# Patient Record
Sex: MALE | Race: WHITE | Employment: FULL TIME | URBAN - METROPOLITAN AREA
[De-identification: names, ages, dates, MRNs, and addresses within clinical notes are randomized per-mention and may not be internally consistent; named-entity substitution may affect disease eponyms.]

---

## 2023-06-18 ENCOUNTER — HOSPITAL ENCOUNTER (INPATIENT)
Age: 63
LOS: 3 days | Discharge: HOME | DRG: 291 | End: 2023-06-21
Attending: EMERGENCY MEDICINE | Admitting: HOSPITALIST
Payer: COMMERCIAL

## 2023-06-18 ENCOUNTER — APPOINTMENT (OUTPATIENT)
Dept: CT IMAGING | Age: 63
DRG: 291 | End: 2023-06-18
Attending: EMERGENCY MEDICINE
Payer: COMMERCIAL

## 2023-06-18 ENCOUNTER — APPOINTMENT (OUTPATIENT)
Dept: GENERAL RADIOLOGY | Age: 63
DRG: 291 | End: 2023-06-18
Payer: COMMERCIAL

## 2023-06-18 DIAGNOSIS — I50.9 NEW ONSET OF CONGESTIVE HEART FAILURE (HCC): Primary | ICD-10-CM

## 2023-06-18 PROBLEM — R06.02 SOB (SHORTNESS OF BREATH): Status: ACTIVE | Noted: 2023-06-18

## 2023-06-18 LAB
ALBUMIN SERPL-MCNC: 3.7 G/DL (ref 3.4–5)
ALBUMIN/GLOB SERPL: 0.8 {RATIO} (ref 1.1–2.2)
ALP SERPL-CCNC: 64 U/L (ref 40–129)
ALT SERPL-CCNC: 13 U/L (ref 10–40)
ANION GAP SERPL CALCULATED.3IONS-SCNC: 8 MMOL/L (ref 3–16)
AST SERPL-CCNC: 36 U/L (ref 15–37)
BASE EXCESS BLDV CALC-SCNC: 7.8 MMOL/L
BASOPHILS # BLD: 0.2 K/UL (ref 0–0.2)
BASOPHILS NFR BLD: 2.6 %
BILIRUB SERPL-MCNC: 0.7 MG/DL (ref 0–1)
BUN SERPL-MCNC: 18 MG/DL (ref 7–20)
CALCIUM SERPL-MCNC: 9.3 MG/DL (ref 8.3–10.6)
CHLORIDE SERPL-SCNC: 100 MMOL/L (ref 99–110)
CO2 BLDV-SCNC: 39 MMOL/L
CO2 SERPL-SCNC: 31 MMOL/L (ref 21–32)
COHGB MFR BLDV: 2 %
CREAT SERPL-MCNC: 1 MG/DL (ref 0.8–1.3)
D DIMER: 1.21 UG/ML FEU (ref 0–0.6)
DEPRECATED RDW RBC AUTO: 14.4 % (ref 12.4–15.4)
EOSINOPHIL # BLD: 0.2 K/UL (ref 0–0.6)
EOSINOPHIL NFR BLD: 2.8 %
GFR SERPLBLD CREATININE-BSD FMLA CKD-EPI: >60 ML/MIN/{1.73_M2}
GLUCOSE BLD-MCNC: 104 MG/DL (ref 70–99)
GLUCOSE SERPL-MCNC: 98 MG/DL (ref 70–99)
HCO3 BLDV-SCNC: 36 MMOL/L (ref 23–29)
HCT VFR BLD AUTO: 36.3 % (ref 40.5–52.5)
HGB BLD-MCNC: 12 G/DL (ref 13.5–17.5)
INR PPP: 1.01 (ref 0.84–1.16)
IRON SATN MFR SERPL: 28 % (ref 20–50)
IRON SERPL-MCNC: 95 UG/DL (ref 59–158)
LACTATE BLDV-SCNC: 1.5 MMOL/L (ref 0.4–1.9)
LACTATE BLDV-SCNC: 1.5 MMOL/L (ref 0.4–1.9)
LYMPHOCYTES # BLD: 2.4 K/UL (ref 1–5.1)
LYMPHOCYTES NFR BLD: 34.8 %
MCH RBC QN AUTO: 34.1 PG (ref 26–34)
MCHC RBC AUTO-ENTMCNC: 33.2 G/DL (ref 31–36)
MCV RBC AUTO: 102.7 FL (ref 80–100)
METHGB MFR BLDV: 0.3 %
MONOCYTES # BLD: 0.6 K/UL (ref 0–1.3)
MONOCYTES NFR BLD: 8.9 %
NEUTROPHILS # BLD: 3.4 K/UL (ref 1.7–7.7)
NEUTROPHILS NFR BLD: 50.9 %
NT-PROBNP SERPL-MCNC: 1205 PG/ML (ref 0–124)
O2 THERAPY: ABNORMAL
PCO2 BLDV: 70.6 MMHG (ref 40–50)
PERFORMED ON: ABNORMAL
PH BLDV: 7.32 [PH] (ref 7.35–7.45)
PLATELET # BLD AUTO: 125 K/UL (ref 135–450)
PLATELET BLD QL SMEAR: ADEQUATE
PMV BLD AUTO: 10.9 FL (ref 5–10.5)
PO2 BLDV: <30 MMHG
POTASSIUM SERPL-SCNC: 4.9 MMOL/L (ref 3.5–5.1)
PROT SERPL-MCNC: 8.1 G/DL (ref 6.4–8.2)
PROTHROMBIN TIME: 13.3 SEC (ref 11.5–14.8)
RBC # BLD AUTO: 3.53 M/UL (ref 4.2–5.9)
REASON FOR REJECTION: NORMAL
REJECTED TEST: NORMAL
SAO2 % BLDV: 48 %
SARS-COV-2 RDRP RESP QL NAA+PROBE: NOT DETECTED
SLIDE REVIEW: ABNORMAL
SODIUM SERPL-SCNC: 139 MMOL/L (ref 136–145)
TIBC SERPL-MCNC: 344 UG/DL (ref 260–445)
TROPONIN, HIGH SENSITIVITY: 19 NG/L (ref 0–22)
TROPONIN, HIGH SENSITIVITY: 21 NG/L (ref 0–22)
TROPONIN, HIGH SENSITIVITY: 26 NG/L (ref 0–22)
TSH SERPL DL<=0.005 MIU/L-ACNC: 2.97 UIU/ML (ref 0.27–4.2)
WBC # BLD AUTO: 6.8 K/UL (ref 4–11)

## 2023-06-18 PROCEDURE — 83550 IRON BINDING TEST: CPT

## 2023-06-18 PROCEDURE — 93005 ELECTROCARDIOGRAM TRACING: CPT | Performed by: EMERGENCY MEDICINE

## 2023-06-18 PROCEDURE — 83880 ASSAY OF NATRIURETIC PEPTIDE: CPT

## 2023-06-18 PROCEDURE — 36415 COLL VENOUS BLD VENIPUNCTURE: CPT

## 2023-06-18 PROCEDURE — 85025 COMPLETE CBC W/AUTO DIFF WBC: CPT

## 2023-06-18 PROCEDURE — 71045 X-RAY EXAM CHEST 1 VIEW: CPT

## 2023-06-18 PROCEDURE — 85610 PROTHROMBIN TIME: CPT

## 2023-06-18 PROCEDURE — 1200000000 HC SEMI PRIVATE

## 2023-06-18 PROCEDURE — 99285 EMERGENCY DEPT VISIT HI MDM: CPT

## 2023-06-18 PROCEDURE — 83540 ASSAY OF IRON: CPT

## 2023-06-18 PROCEDURE — 84443 ASSAY THYROID STIM HORMONE: CPT

## 2023-06-18 PROCEDURE — 6370000000 HC RX 637 (ALT 250 FOR IP): Performed by: EMERGENCY MEDICINE

## 2023-06-18 PROCEDURE — 6360000002 HC RX W HCPCS: Performed by: HOSPITALIST

## 2023-06-18 PROCEDURE — 80053 COMPREHEN METABOLIC PANEL: CPT

## 2023-06-18 PROCEDURE — 6370000000 HC RX 637 (ALT 250 FOR IP): Performed by: HOSPITALIST

## 2023-06-18 PROCEDURE — 71260 CT THORAX DX C+: CPT

## 2023-06-18 PROCEDURE — 2700000000 HC OXYGEN THERAPY PER DAY

## 2023-06-18 PROCEDURE — 6360000004 HC RX CONTRAST MEDICATION: Performed by: EMERGENCY MEDICINE

## 2023-06-18 PROCEDURE — 6360000002 HC RX W HCPCS: Performed by: EMERGENCY MEDICINE

## 2023-06-18 PROCEDURE — 96374 THER/PROPH/DIAG INJ IV PUSH: CPT

## 2023-06-18 PROCEDURE — 84484 ASSAY OF TROPONIN QUANT: CPT

## 2023-06-18 PROCEDURE — 82803 BLOOD GASES ANY COMBINATION: CPT

## 2023-06-18 PROCEDURE — 94640 AIRWAY INHALATION TREATMENT: CPT

## 2023-06-18 PROCEDURE — 87635 SARS-COV-2 COVID-19 AMP PRB: CPT

## 2023-06-18 PROCEDURE — 94760 N-INVAS EAR/PLS OXIMETRY 1: CPT

## 2023-06-18 PROCEDURE — 83036 HEMOGLOBIN GLYCOSYLATED A1C: CPT

## 2023-06-18 PROCEDURE — 2580000003 HC RX 258: Performed by: HOSPITALIST

## 2023-06-18 PROCEDURE — 87040 BLOOD CULTURE FOR BACTERIA: CPT

## 2023-06-18 PROCEDURE — 83605 ASSAY OF LACTIC ACID: CPT

## 2023-06-18 PROCEDURE — 85379 FIBRIN DEGRADATION QUANT: CPT

## 2023-06-18 PROCEDURE — 2580000003 HC RX 258: Performed by: EMERGENCY MEDICINE

## 2023-06-18 RX ORDER — LISINOPRIL 40 MG/1
40 TABLET ORAL DAILY
Status: DISCONTINUED | OUTPATIENT
Start: 2023-06-19 | End: 2023-06-21 | Stop reason: HOSPADM

## 2023-06-18 RX ORDER — CETIRIZINE HYDROCHLORIDE 10 MG/1
10 TABLET ORAL DAILY
Status: DISCONTINUED | OUTPATIENT
Start: 2023-06-19 | End: 2023-06-21 | Stop reason: HOSPADM

## 2023-06-18 RX ORDER — ONDANSETRON 2 MG/ML
4 INJECTION INTRAMUSCULAR; INTRAVENOUS EVERY 6 HOURS PRN
Status: DISCONTINUED | OUTPATIENT
Start: 2023-06-18 | End: 2023-06-21 | Stop reason: HOSPADM

## 2023-06-18 RX ORDER — ACETAMINOPHEN 325 MG/1
650 TABLET ORAL EVERY 6 HOURS PRN
Status: DISCONTINUED | OUTPATIENT
Start: 2023-06-18 | End: 2023-06-21 | Stop reason: HOSPADM

## 2023-06-18 RX ORDER — LISINOPRIL 40 MG/1
40 TABLET ORAL DAILY
COMMUNITY

## 2023-06-18 RX ORDER — IPRATROPIUM BROMIDE AND ALBUTEROL SULFATE 2.5; .5 MG/3ML; MG/3ML
1 SOLUTION RESPIRATORY (INHALATION) ONCE
Status: COMPLETED | OUTPATIENT
Start: 2023-06-18 | End: 2023-06-18

## 2023-06-18 RX ORDER — ENOXAPARIN SODIUM 100 MG/ML
40 INJECTION SUBCUTANEOUS 2 TIMES DAILY
Status: DISCONTINUED | OUTPATIENT
Start: 2023-06-18 | End: 2023-06-21 | Stop reason: HOSPADM

## 2023-06-18 RX ORDER — METFORMIN HYDROCHLORIDE 500 MG/1
500 TABLET, EXTENDED RELEASE ORAL
COMMUNITY

## 2023-06-18 RX ORDER — SODIUM CHLORIDE 0.9 % (FLUSH) 0.9 %
5-40 SYRINGE (ML) INJECTION PRN
Status: DISCONTINUED | OUTPATIENT
Start: 2023-06-18 | End: 2023-06-21 | Stop reason: HOSPADM

## 2023-06-18 RX ORDER — POLYETHYLENE GLYCOL 3350 17 G/17G
17 POWDER, FOR SOLUTION ORAL DAILY PRN
Status: DISCONTINUED | OUTPATIENT
Start: 2023-06-18 | End: 2023-06-21 | Stop reason: HOSPADM

## 2023-06-18 RX ORDER — LOSARTAN POTASSIUM 25 MG/1
25 TABLET ORAL DAILY
Status: DISCONTINUED | OUTPATIENT
Start: 2023-06-18 | End: 2023-06-18

## 2023-06-18 RX ORDER — FUROSEMIDE 10 MG/ML
40 INJECTION INTRAMUSCULAR; INTRAVENOUS ONCE
Status: COMPLETED | OUTPATIENT
Start: 2023-06-18 | End: 2023-06-18

## 2023-06-18 RX ORDER — SODIUM CHLORIDE 0.9 % (FLUSH) 0.9 %
5-40 SYRINGE (ML) INJECTION EVERY 12 HOURS SCHEDULED
Status: DISCONTINUED | OUTPATIENT
Start: 2023-06-18 | End: 2023-06-21 | Stop reason: HOSPADM

## 2023-06-18 RX ORDER — INSULIN LISPRO 100 [IU]/ML
0-4 INJECTION, SOLUTION INTRAVENOUS; SUBCUTANEOUS NIGHTLY
Status: DISCONTINUED | OUTPATIENT
Start: 2023-06-18 | End: 2023-06-21 | Stop reason: HOSPADM

## 2023-06-18 RX ORDER — ASPIRIN 81 MG/1
81 TABLET ORAL DAILY
Status: DISCONTINUED | OUTPATIENT
Start: 2023-06-19 | End: 2023-06-21 | Stop reason: HOSPADM

## 2023-06-18 RX ORDER — LORATADINE 10 MG/1
5 TABLET ORAL DAILY
Status: DISCONTINUED | OUTPATIENT
Start: 2023-06-18 | End: 2023-06-18 | Stop reason: CLARIF

## 2023-06-18 RX ORDER — POTASSIUM CHLORIDE 20 MEQ/1
40 TABLET, EXTENDED RELEASE ORAL PRN
Status: DISCONTINUED | OUTPATIENT
Start: 2023-06-18 | End: 2023-06-21 | Stop reason: HOSPADM

## 2023-06-18 RX ORDER — INSULIN LISPRO 100 [IU]/ML
0-8 INJECTION, SOLUTION INTRAVENOUS; SUBCUTANEOUS
Status: DISCONTINUED | OUTPATIENT
Start: 2023-06-18 | End: 2023-06-21 | Stop reason: HOSPADM

## 2023-06-18 RX ORDER — MAGNESIUM SULFATE IN WATER 40 MG/ML
2000 INJECTION, SOLUTION INTRAVENOUS PRN
Status: DISCONTINUED | OUTPATIENT
Start: 2023-06-18 | End: 2023-06-21 | Stop reason: HOSPADM

## 2023-06-18 RX ORDER — IPRATROPIUM BROMIDE AND ALBUTEROL SULFATE 2.5; .5 MG/3ML; MG/3ML
1 SOLUTION RESPIRATORY (INHALATION)
Status: DISCONTINUED | OUTPATIENT
Start: 2023-06-19 | End: 2023-06-19

## 2023-06-18 RX ORDER — DEXTROSE MONOHYDRATE 100 MG/ML
INJECTION, SOLUTION INTRAVENOUS CONTINUOUS PRN
Status: DISCONTINUED | OUTPATIENT
Start: 2023-06-18 | End: 2023-06-21 | Stop reason: HOSPADM

## 2023-06-18 RX ORDER — FAMOTIDINE 20 MG/1
20 TABLET, FILM COATED ORAL 2 TIMES DAILY
Status: DISCONTINUED | OUTPATIENT
Start: 2023-06-18 | End: 2023-06-21 | Stop reason: HOSPADM

## 2023-06-18 RX ORDER — FUROSEMIDE 10 MG/ML
40 INJECTION INTRAMUSCULAR; INTRAVENOUS 2 TIMES DAILY
Status: DISCONTINUED | OUTPATIENT
Start: 2023-06-18 | End: 2023-06-20

## 2023-06-18 RX ORDER — LEVOFLOXACIN 500 MG/1
500 TABLET, FILM COATED ORAL DAILY
Status: DISCONTINUED | OUTPATIENT
Start: 2023-06-18 | End: 2023-06-20

## 2023-06-18 RX ORDER — SODIUM CHLORIDE 9 MG/ML
INJECTION, SOLUTION INTRAVENOUS PRN
Status: DISCONTINUED | OUTPATIENT
Start: 2023-06-18 | End: 2023-06-21 | Stop reason: HOSPADM

## 2023-06-18 RX ORDER — ASPIRIN 81 MG/1
81 TABLET ORAL DAILY
COMMUNITY

## 2023-06-18 RX ORDER — METOPROLOL SUCCINATE 25 MG/1
25 TABLET, EXTENDED RELEASE ORAL DAILY
Status: DISCONTINUED | OUTPATIENT
Start: 2023-06-18 | End: 2023-06-21 | Stop reason: HOSPADM

## 2023-06-18 RX ORDER — POTASSIUM CHLORIDE 7.45 MG/ML
10 INJECTION INTRAVENOUS PRN
Status: DISCONTINUED | OUTPATIENT
Start: 2023-06-18 | End: 2023-06-21 | Stop reason: HOSPADM

## 2023-06-18 RX ORDER — ACETAMINOPHEN 650 MG/1
650 SUPPOSITORY RECTAL EVERY 6 HOURS PRN
Status: DISCONTINUED | OUTPATIENT
Start: 2023-06-18 | End: 2023-06-21 | Stop reason: HOSPADM

## 2023-06-18 RX ORDER — ONDANSETRON 4 MG/1
4 TABLET, ORALLY DISINTEGRATING ORAL EVERY 8 HOURS PRN
Status: DISCONTINUED | OUTPATIENT
Start: 2023-06-18 | End: 2023-06-21 | Stop reason: HOSPADM

## 2023-06-18 RX ADMIN — CEFTRIAXONE 1000 MG: 1 INJECTION, POWDER, FOR SOLUTION INTRAMUSCULAR; INTRAVENOUS at 16:36

## 2023-06-18 RX ADMIN — Medication 10 ML: at 21:47

## 2023-06-18 RX ADMIN — LEVOFLOXACIN 500 MG: 500 TABLET, FILM COATED ORAL at 21:40

## 2023-06-18 RX ADMIN — IOPAMIDOL 75 ML: 755 INJECTION, SOLUTION INTRAVENOUS at 14:58

## 2023-06-18 RX ADMIN — FUROSEMIDE 40 MG: 10 INJECTION, SOLUTION INTRAMUSCULAR; INTRAVENOUS at 14:40

## 2023-06-18 RX ADMIN — FAMOTIDINE 20 MG: 20 TABLET ORAL at 21:40

## 2023-06-18 RX ADMIN — FUROSEMIDE 40 MG: 10 INJECTION, SOLUTION INTRAMUSCULAR; INTRAVENOUS at 21:39

## 2023-06-18 RX ADMIN — METOPROLOL SUCCINATE 25 MG: 25 TABLET, EXTENDED RELEASE ORAL at 21:40

## 2023-06-18 RX ADMIN — ENOXAPARIN SODIUM 40 MG: 100 INJECTION SUBCUTANEOUS at 21:50

## 2023-06-18 RX ADMIN — IPRATROPIUM BROMIDE AND ALBUTEROL SULFATE 1 DOSE: 2.5; .5 SOLUTION RESPIRATORY (INHALATION) at 13:20

## 2023-06-18 ASSESSMENT — LIFESTYLE VARIABLES: HOW OFTEN DO YOU HAVE A DRINK CONTAINING ALCOHOL: NEVER

## 2023-06-18 ASSESSMENT — PAIN - FUNCTIONAL ASSESSMENT: PAIN_FUNCTIONAL_ASSESSMENT: NONE - DENIES PAIN

## 2023-06-19 LAB
ANION GAP SERPL CALCULATED.3IONS-SCNC: 12 MMOL/L (ref 3–16)
BASOPHILS # BLD: 0.1 K/UL (ref 0–0.2)
BASOPHILS NFR BLD: 1.2 %
BUN SERPL-MCNC: 19 MG/DL (ref 7–20)
CALCIUM SERPL-MCNC: 9.5 MG/DL (ref 8.3–10.6)
CHLORIDE SERPL-SCNC: 95 MMOL/L (ref 99–110)
CHOLEST SERPL-MCNC: 130 MG/DL (ref 0–199)
CO2 SERPL-SCNC: 31 MMOL/L (ref 21–32)
CREAT SERPL-MCNC: 1.3 MG/DL (ref 0.8–1.3)
DEPRECATED RDW RBC AUTO: 14.5 % (ref 12.4–15.4)
EKG ATRIAL RATE: 64 BPM
EKG DIAGNOSIS: NORMAL
EKG P AXIS: 2 DEGREES
EKG P-R INTERVAL: 214 MS
EKG Q-T INTERVAL: 414 MS
EKG QRS DURATION: 92 MS
EKG QTC CALCULATION (BAZETT): 427 MS
EKG R AXIS: -53 DEGREES
EKG T AXIS: 47 DEGREES
EKG VENTRICULAR RATE: 64 BPM
EOSINOPHIL # BLD: 0.2 K/UL (ref 0–0.6)
EOSINOPHIL NFR BLD: 3.2 %
EST. AVERAGE GLUCOSE BLD GHB EST-MCNC: 137 MG/DL
EST. AVERAGE GLUCOSE BLD GHB EST-MCNC: 137 MG/DL
GFR SERPLBLD CREATININE-BSD FMLA CKD-EPI: >60 ML/MIN/{1.73_M2}
GLUCOSE BLD-MCNC: 107 MG/DL (ref 70–99)
GLUCOSE BLD-MCNC: 114 MG/DL (ref 70–99)
GLUCOSE BLD-MCNC: 121 MG/DL (ref 70–99)
GLUCOSE BLD-MCNC: 151 MG/DL (ref 70–99)
GLUCOSE SERPL-MCNC: 124 MG/DL (ref 70–99)
HBA1C MFR BLD: 6.4 %
HBA1C MFR BLD: 6.4 %
HCT VFR BLD AUTO: 38.5 % (ref 40.5–52.5)
HDLC SERPL-MCNC: 52 MG/DL (ref 40–60)
HGB BLD-MCNC: 12.8 G/DL (ref 13.5–17.5)
INR PPP: 1.15 (ref 0.84–1.16)
LDLC SERPL CALC-MCNC: 60 MG/DL
LV EF: 58 %
LVEF MODALITY: NORMAL
LYMPHOCYTES # BLD: 2.4 K/UL (ref 1–5.1)
LYMPHOCYTES NFR BLD: 35.5 %
MAGNESIUM SERPL-MCNC: 1.5 MG/DL (ref 1.8–2.4)
MCH RBC QN AUTO: 33.9 PG (ref 26–34)
MCHC RBC AUTO-ENTMCNC: 33.2 G/DL (ref 31–36)
MCV RBC AUTO: 102.3 FL (ref 80–100)
MONOCYTES # BLD: 0.8 K/UL (ref 0–1.3)
MONOCYTES NFR BLD: 11.7 %
NEUTROPHILS # BLD: 3.3 K/UL (ref 1.7–7.7)
NEUTROPHILS NFR BLD: 48.4 %
PERFORMED ON: ABNORMAL
PLATELET # BLD AUTO: 144 K/UL (ref 135–450)
PMV BLD AUTO: 10.9 FL (ref 5–10.5)
POTASSIUM SERPL-SCNC: 4 MMOL/L (ref 3.5–5.1)
PROCALCITONIN SERPL IA-MCNC: 0.17 NG/ML (ref 0–0.15)
PROTHROMBIN TIME: 14.7 SEC (ref 11.5–14.8)
RBC # BLD AUTO: 3.77 M/UL (ref 4.2–5.9)
SODIUM SERPL-SCNC: 138 MMOL/L (ref 136–145)
TRIGL SERPL-MCNC: 90 MG/DL (ref 0–150)
TROPONIN, HIGH SENSITIVITY: 22 NG/L (ref 0–22)
TROPONIN, HIGH SENSITIVITY: 23 NG/L (ref 0–22)
TROPONIN, HIGH SENSITIVITY: 24 NG/L (ref 0–22)
TROPONIN, HIGH SENSITIVITY: 25 NG/L (ref 0–22)
VLDLC SERPL CALC-MCNC: 18 MG/DL
WBC # BLD AUTO: 6.8 K/UL (ref 4–11)

## 2023-06-19 PROCEDURE — 6370000000 HC RX 637 (ALT 250 FOR IP): Performed by: HOSPITALIST

## 2023-06-19 PROCEDURE — 1200000000 HC SEMI PRIVATE

## 2023-06-19 PROCEDURE — 85610 PROTHROMBIN TIME: CPT

## 2023-06-19 PROCEDURE — 85025 COMPLETE CBC W/AUTO DIFF WBC: CPT

## 2023-06-19 PROCEDURE — 93010 ELECTROCARDIOGRAM REPORT: CPT | Performed by: INTERNAL MEDICINE

## 2023-06-19 PROCEDURE — 36415 COLL VENOUS BLD VENIPUNCTURE: CPT

## 2023-06-19 PROCEDURE — 83735 ASSAY OF MAGNESIUM: CPT

## 2023-06-19 PROCEDURE — 84484 ASSAY OF TROPONIN QUANT: CPT

## 2023-06-19 PROCEDURE — 84145 PROCALCITONIN (PCT): CPT

## 2023-06-19 PROCEDURE — 94640 AIRWAY INHALATION TREATMENT: CPT

## 2023-06-19 PROCEDURE — 94760 N-INVAS EAR/PLS OXIMETRY 1: CPT

## 2023-06-19 PROCEDURE — 2580000003 HC RX 258: Performed by: HOSPITALIST

## 2023-06-19 PROCEDURE — 80048 BASIC METABOLIC PNL TOTAL CA: CPT

## 2023-06-19 PROCEDURE — 6360000002 HC RX W HCPCS: Performed by: HOSPITALIST

## 2023-06-19 PROCEDURE — 93306 TTE W/DOPPLER COMPLETE: CPT

## 2023-06-19 PROCEDURE — 80061 LIPID PANEL: CPT

## 2023-06-19 RX ORDER — IPRATROPIUM BROMIDE AND ALBUTEROL SULFATE 2.5; .5 MG/3ML; MG/3ML
1 SOLUTION RESPIRATORY (INHALATION) EVERY 4 HOURS PRN
Status: DISCONTINUED | OUTPATIENT
Start: 2023-06-19 | End: 2023-06-21 | Stop reason: HOSPADM

## 2023-06-19 RX ADMIN — LISINOPRIL 40 MG: 40 TABLET ORAL at 09:11

## 2023-06-19 RX ADMIN — IPRATROPIUM BROMIDE AND ALBUTEROL SULFATE 1 DOSE: 2.5; .5 SOLUTION RESPIRATORY (INHALATION) at 15:33

## 2023-06-19 RX ADMIN — IPRATROPIUM BROMIDE AND ALBUTEROL SULFATE 1 DOSE: 2.5; .5 SOLUTION RESPIRATORY (INHALATION) at 11:23

## 2023-06-19 RX ADMIN — ASPIRIN 81 MG: 81 TABLET, COATED ORAL at 09:11

## 2023-06-19 RX ADMIN — CETIRIZINE HYDROCHLORIDE 10 MG: 10 TABLET, FILM COATED ORAL at 09:11

## 2023-06-19 RX ADMIN — FAMOTIDINE 20 MG: 20 TABLET ORAL at 09:11

## 2023-06-19 RX ADMIN — IPRATROPIUM BROMIDE AND ALBUTEROL SULFATE 1 DOSE: 2.5; .5 SOLUTION RESPIRATORY (INHALATION) at 19:52

## 2023-06-19 RX ADMIN — Medication 5000 UNITS: at 09:11

## 2023-06-19 RX ADMIN — MAGNESIUM SULFATE HEPTAHYDRATE 2000 MG: 40 INJECTION, SOLUTION INTRAVENOUS at 16:45

## 2023-06-19 RX ADMIN — FUROSEMIDE 40 MG: 10 INJECTION, SOLUTION INTRAMUSCULAR; INTRAVENOUS at 19:24

## 2023-06-19 RX ADMIN — METOPROLOL SUCCINATE 25 MG: 25 TABLET, EXTENDED RELEASE ORAL at 09:11

## 2023-06-19 RX ADMIN — IPRATROPIUM BROMIDE AND ALBUTEROL SULFATE 1 DOSE: 2.5; .5 SOLUTION RESPIRATORY (INHALATION) at 07:19

## 2023-06-19 RX ADMIN — Medication 10 ML: at 09:12

## 2023-06-19 RX ADMIN — ENOXAPARIN SODIUM 40 MG: 100 INJECTION SUBCUTANEOUS at 09:11

## 2023-06-19 RX ADMIN — ENOXAPARIN SODIUM 40 MG: 100 INJECTION SUBCUTANEOUS at 20:34

## 2023-06-19 RX ADMIN — FAMOTIDINE 20 MG: 20 TABLET ORAL at 20:34

## 2023-06-19 RX ADMIN — FUROSEMIDE 40 MG: 10 INJECTION, SOLUTION INTRAMUSCULAR; INTRAVENOUS at 09:12

## 2023-06-19 RX ADMIN — Medication 10 ML: at 20:36

## 2023-06-19 RX ADMIN — LEVOFLOXACIN 500 MG: 500 TABLET, FILM COATED ORAL at 09:11

## 2023-06-19 NOTE — DISCHARGE INSTRUCTIONS
Extra Heart Failure sites:     [email or web address]   --- this is American Heart Association interactive Healthier Living with Heart Failure guidebook. Please click hyperlink or copy / paste link into search bar. Use your mouse to scroll through the pages. Lots of information about weight monitoring, diet tips, activity, meds, etc    HF [place] luis f  -- this is a free smart phone luis f available for iPhone and Android download. Use your phone to track sodium / fluid intake, zone tool symptom tracking, weights, medications, etc. Click on this hyperlink  HF [place] Luis F   for QR code for easy download. DASH (Dietary Approach to Stop Hypertension) diet --  [email or web address].no -- this diet is a flexible eating plan that promotes heart healthy eating style. Click on hyperlink or copy / paste link into search bar. Lots of low sodium recipes and tips.     [email or web address]  -- more free recipes

## 2023-06-19 NOTE — H&P
Hospitalist  History and Physical    Patient:  Alise Mir  MRN: [id number]  PCP: José Manuel Camacho    CHIEF COMPLAINT: Shortness of breath      HISTORY OF PRESENT ILLNESS:   The patient Alise Mir is a 60 y.o.male   Patient presented to the emergency room with worsening shortness of breath. Patient's wife reported that patient had been sleeping in the chair more and more for the last several days. Patient reports dyspnea on exertion and orthopnea patient denies any chest pain. Patient denies weight gain in the last few weeks. Past Medical History:        Diagnosis Date    Arthritis     Asthma     COPD (chronic obstructive pulmonary disease) ([facility] [address]     Gout     High blood pressure        Past Surgical History:        Procedure Laterality Date    EYE SURGERY      KNEE ARTHROSCOPY Right 6/26/2014    medial meniscectomy & chondroplasty       Medications Prior to Admission:    Prior to Admission medications    Medication Sig Start Date End Date Taking? Authorizing Provider   aspirin 81 MG EC tablet Take 1 tablet by mouth daily   Yes Historical Provider, MD   lisinopril (PRINIVIL;ZESTRIL) 40 MG tablet Take 1 tablet by mouth daily   Yes Historical Provider, MD   metFORMIN (GLUCOPHAGE-XR) 500 MG extended release tablet Take 1 tablet by mouth daily (with breakfast)   Yes Historical Provider, MD   Loratadine (CLARITIN PO) Take  by mouth. Historical Provider, MD   Albuterol (PROVENTIL IN) Inhale  into the lungs. Historical Provider, MD   FIBER, GUAR GUM, PO Take  by mouth. Historical Provider, MD   vitamin D-3 (CHOLECALCIFEROL) 5000 UNITS TABS Take 1 tablet by mouth daily    Historical Provider, MD       Allergies:  Patient has no known allergies. Social History:   TOBACCO:   reports that he has never smoked. He has never used smokeless tobacco.  ETOH:   reports current alcohol use.         Family History:       Problem Relation Age of Onset    Anemia Sister     Other Father         gout           REVIEW OF

## 2023-06-20 LAB
ANION GAP SERPL CALCULATED.3IONS-SCNC: 8 MMOL/L (ref 3–16)
BUN SERPL-MCNC: 23 MG/DL (ref 7–20)
CALCIUM SERPL-MCNC: 9.8 MG/DL (ref 8.3–10.6)
CHLORIDE SERPL-SCNC: 92 MMOL/L (ref 99–110)
CO2 SERPL-SCNC: 35 MMOL/L (ref 21–32)
CREAT SERPL-MCNC: 1.4 MG/DL (ref 0.8–1.3)
GFR SERPLBLD CREATININE-BSD FMLA CKD-EPI: 56 ML/MIN/{1.73_M2}
GLUCOSE BLD-MCNC: 107 MG/DL (ref 70–99)
GLUCOSE BLD-MCNC: 108 MG/DL (ref 70–99)
GLUCOSE BLD-MCNC: 116 MG/DL (ref 70–99)
GLUCOSE BLD-MCNC: 119 MG/DL (ref 70–99)
GLUCOSE BLD-MCNC: 129 MG/DL (ref 70–99)
GLUCOSE SERPL-MCNC: 131 MG/DL (ref 70–99)
MAGNESIUM SERPL-MCNC: 1.8 MG/DL (ref 1.8–2.4)
PERFORMED ON: ABNORMAL
POTASSIUM SERPL-SCNC: 3.8 MMOL/L (ref 3.5–5.1)
SODIUM SERPL-SCNC: 135 MMOL/L (ref 136–145)
TROPONIN, HIGH SENSITIVITY: 22 NG/L (ref 0–22)
TROPONIN, HIGH SENSITIVITY: 23 NG/L (ref 0–22)
TROPONIN, HIGH SENSITIVITY: 24 NG/L (ref 0–22)

## 2023-06-20 PROCEDURE — 2580000003 HC RX 258: Performed by: HOSPITALIST

## 2023-06-20 PROCEDURE — 80048 BASIC METABOLIC PNL TOTAL CA: CPT

## 2023-06-20 PROCEDURE — 6370000000 HC RX 637 (ALT 250 FOR IP): Performed by: HOSPITALIST

## 2023-06-20 PROCEDURE — 36415 COLL VENOUS BLD VENIPUNCTURE: CPT

## 2023-06-20 PROCEDURE — 84484 ASSAY OF TROPONIN QUANT: CPT

## 2023-06-20 PROCEDURE — 83735 ASSAY OF MAGNESIUM: CPT

## 2023-06-20 PROCEDURE — 6360000002 HC RX W HCPCS: Performed by: HOSPITALIST

## 2023-06-20 PROCEDURE — 1200000000 HC SEMI PRIVATE

## 2023-06-20 PROCEDURE — 94760 N-INVAS EAR/PLS OXIMETRY 1: CPT

## 2023-06-20 RX ADMIN — LEVOFLOXACIN 500 MG: 500 TABLET, FILM COATED ORAL at 08:40

## 2023-06-20 RX ADMIN — LISINOPRIL 40 MG: 40 TABLET ORAL at 09:39

## 2023-06-20 RX ADMIN — Medication 5000 UNITS: at 08:40

## 2023-06-20 RX ADMIN — Medication 10 ML: at 08:43

## 2023-06-20 RX ADMIN — ENOXAPARIN SODIUM 40 MG: 100 INJECTION SUBCUTANEOUS at 08:39

## 2023-06-20 RX ADMIN — CETIRIZINE HYDROCHLORIDE 10 MG: 10 TABLET, FILM COATED ORAL at 08:40

## 2023-06-20 RX ADMIN — Medication 10 ML: at 20:33

## 2023-06-20 RX ADMIN — Medication 10 ML: at 08:48

## 2023-06-20 RX ADMIN — ASPIRIN 81 MG: 81 TABLET, COATED ORAL at 08:40

## 2023-06-20 RX ADMIN — FAMOTIDINE 20 MG: 20 TABLET ORAL at 20:32

## 2023-06-20 RX ADMIN — FUROSEMIDE 40 MG: 10 INJECTION, SOLUTION INTRAMUSCULAR; INTRAVENOUS at 08:40

## 2023-06-20 RX ADMIN — FAMOTIDINE 20 MG: 20 TABLET ORAL at 08:40

## 2023-06-20 RX ADMIN — ENOXAPARIN SODIUM 40 MG: 100 INJECTION SUBCUTANEOUS at 20:32

## 2023-06-20 RX ADMIN — METOPROLOL SUCCINATE 25 MG: 25 TABLET, EXTENDED RELEASE ORAL at 08:40

## 2023-06-20 NOTE — RT PROTOCOL NOTE
enter or revise RT Bronchodilator order(s) to two equivalent RT bronchodilator orders with one order with TID Frequency and one order with Frequency of every 4 hours PRN wheezing or increased work of breathing using Per Protocol order mode. 11-13 - enter or revise RT Bronchodilator order(s) to one equivalent RT bronchodilator order with QID Frequency and an Albuterol order with Frequency of every 4 hours PRN wheezing or increased work of breathing using Per Protocol order mode. Greater than 13 - enter or revise RT Bronchodilator order(s) to one equivalent RT bronchodilator order with every 4 hours Frequency and an Albuterol order with Frequency of every 2 hours PRN wheezing or increased work of breathing using Per Protocol order mode. RT to enter RT Home Evaluation for COPD & MDI Assessment order using Per Protocol order mode.     Electronically signed by Isma Hsu RCP on 6/19/2023 at 9:09 PM

## 2023-06-21 ENCOUNTER — APPOINTMENT (OUTPATIENT)
Dept: ULTRASOUND IMAGING | Age: 63
DRG: 291 | End: 2023-06-21
Payer: COMMERCIAL

## 2023-06-21 ENCOUNTER — APPOINTMENT (OUTPATIENT)
Dept: GENERAL RADIOLOGY | Age: 63
DRG: 291 | End: 2023-06-21
Payer: COMMERCIAL

## 2023-06-21 VITALS
SYSTOLIC BLOOD PRESSURE: 101 MMHG | RESPIRATION RATE: 14 BRPM | BODY MASS INDEX: 47.74 KG/M2 | WEIGHT: 315 LBS | HEIGHT: 68 IN | HEART RATE: 58 BPM | OXYGEN SATURATION: 96 % | TEMPERATURE: 98.1 F | DIASTOLIC BLOOD PRESSURE: 58 MMHG

## 2023-06-21 LAB
ALBUMIN SERPL-MCNC: 3.6 G/DL (ref 3.4–5)
ALBUMIN/GLOB SERPL: 0.8 {RATIO} (ref 1.1–2.2)
ALP SERPL-CCNC: 67 U/L (ref 40–129)
ALT SERPL-CCNC: 15 U/L (ref 10–40)
ANION GAP SERPL CALCULATED.3IONS-SCNC: 7 MMOL/L (ref 3–16)
AST SERPL-CCNC: 37 U/L (ref 15–37)
BILIRUB SERPL-MCNC: 1.1 MG/DL (ref 0–1)
BUN SERPL-MCNC: 28 MG/DL (ref 7–20)
CALCIUM SERPL-MCNC: 9.9 MG/DL (ref 8.3–10.6)
CHLORIDE SERPL-SCNC: 93 MMOL/L (ref 99–110)
CO2 SERPL-SCNC: 37 MMOL/L (ref 21–32)
CREAT SERPL-MCNC: 1.4 MG/DL (ref 0.8–1.3)
FERRITIN SERPL IA-MCNC: 109.3 NG/ML (ref 30–400)
GFR SERPLBLD CREATININE-BSD FMLA CKD-EPI: 56 ML/MIN/{1.73_M2}
GLUCOSE BLD-MCNC: 115 MG/DL (ref 70–99)
GLUCOSE BLD-MCNC: 116 MG/DL (ref 70–99)
GLUCOSE BLD-MCNC: 126 MG/DL (ref 70–99)
GLUCOSE SERPL-MCNC: 131 MG/DL (ref 70–99)
HAV IGM SERPL QL IA: NORMAL
HBV CORE IGM SERPL QL IA: NORMAL
HBV SURFACE AG SERPL QL IA: NORMAL
HCV AB SERPL QL IA: NORMAL
MAGNESIUM SERPL-MCNC: 1.9 MG/DL (ref 1.8–2.4)
NT-PROBNP SERPL-MCNC: 430 PG/ML (ref 0–124)
PERFORMED ON: ABNORMAL
POTASSIUM SERPL-SCNC: 4.2 MMOL/L (ref 3.5–5.1)
PROT SERPL-MCNC: 8.1 G/DL (ref 6.4–8.2)
SODIUM SERPL-SCNC: 137 MMOL/L (ref 136–145)

## 2023-06-21 PROCEDURE — 71045 X-RAY EXAM CHEST 1 VIEW: CPT

## 2023-06-21 PROCEDURE — 76705 ECHO EXAM OF ABDOMEN: CPT

## 2023-06-21 PROCEDURE — 80074 ACUTE HEPATITIS PANEL: CPT

## 2023-06-21 PROCEDURE — 82728 ASSAY OF FERRITIN: CPT

## 2023-06-21 PROCEDURE — 81256 HFE GENE: CPT

## 2023-06-21 PROCEDURE — 2700000000 HC OXYGEN THERAPY PER DAY

## 2023-06-21 PROCEDURE — 82104 ALPHA-1-ANTITRYPSIN PHENO: CPT

## 2023-06-21 PROCEDURE — 80053 COMPREHEN METABOLIC PANEL: CPT

## 2023-06-21 PROCEDURE — 6370000000 HC RX 637 (ALT 250 FOR IP): Performed by: HOSPITALIST

## 2023-06-21 PROCEDURE — 2580000003 HC RX 258: Performed by: HOSPITALIST

## 2023-06-21 PROCEDURE — 82103 ALPHA-1-ANTITRYPSIN TOTAL: CPT

## 2023-06-21 PROCEDURE — 94760 N-INVAS EAR/PLS OXIMETRY 1: CPT

## 2023-06-21 PROCEDURE — 36415 COLL VENOUS BLD VENIPUNCTURE: CPT

## 2023-06-21 PROCEDURE — 82105 ALPHA-FETOPROTEIN SERUM: CPT

## 2023-06-21 PROCEDURE — 6360000002 HC RX W HCPCS: Performed by: HOSPITALIST

## 2023-06-21 PROCEDURE — 83880 ASSAY OF NATRIURETIC PEPTIDE: CPT

## 2023-06-21 PROCEDURE — 83735 ASSAY OF MAGNESIUM: CPT

## 2023-06-21 RX ORDER — METOPROLOL SUCCINATE 25 MG/1
25 TABLET, EXTENDED RELEASE ORAL DAILY
Qty: 30 TABLET | Refills: 1 | Status: SHIPPED | OUTPATIENT
Start: 2023-06-22

## 2023-06-21 RX ORDER — FUROSEMIDE 40 MG/1
40 TABLET ORAL DAILY
Qty: 60 TABLET | Refills: 3 | Status: SHIPPED | OUTPATIENT
Start: 2023-06-22

## 2023-06-21 RX ADMIN — CETIRIZINE HYDROCHLORIDE 10 MG: 10 TABLET, FILM COATED ORAL at 08:46

## 2023-06-21 RX ADMIN — METOPROLOL SUCCINATE 25 MG: 25 TABLET, EXTENDED RELEASE ORAL at 08:46

## 2023-06-21 RX ADMIN — ASPIRIN 81 MG: 81 TABLET, COATED ORAL at 08:46

## 2023-06-21 RX ADMIN — ENOXAPARIN SODIUM 40 MG: 100 INJECTION SUBCUTANEOUS at 08:45

## 2023-06-21 RX ADMIN — Medication 10 ML: at 08:46

## 2023-06-21 RX ADMIN — FAMOTIDINE 20 MG: 20 TABLET ORAL at 08:46

## 2023-06-21 RX ADMIN — LISINOPRIL 40 MG: 40 TABLET ORAL at 08:46

## 2023-06-21 RX ADMIN — Medication 5000 UNITS: at 08:46

## 2023-06-21 NOTE — CONSULTS
Comprehensive Nutrition Assessment    Type and Reason for Visit:  Initial, Consult    Nutrition Recommendations/Plan:   Continue current diet. Diet education. Monitor intakes/labs/medications and reassess need for further nutrition therapy. Honor food/fluid preferences as possible. Malnutrition Assessment:  Malnutrition Status: At risk for malnutrition (Comment) (Cirrhosis.) (06/19/23 1003)    Context:  Acute Illness     Findings of the 6 clinical characteristics of malnutrition:  Energy Intake:  Mild decrease in energy intake (Comment)  Weight Loss:  No significant weight loss     Body Fat Loss:  No significant body fat loss     Muscle Mass Loss:  No significant muscle mass loss    Fluid Accumulation:  Moderate to Severe Extremities   Strength:  Not Performed    Nutrition Assessment:    Pt. reports experiencing SOB while at home with his wife starting several days ago, originally he throught it was d/t anxiety because his power was out. SOB worsening with increased activity. PMH includes liver cirrhosis and T2DM, HTN. Chest x ray and CT reveal pulmonary edema. Pt. receiving a regular, 2g Na diet. Tolerating okay per pt. Received consult for Heart failure education. Awaiting further workup to determine if education is appropriate. SOB may be a result of pulmonary edema. Will continue to follow while inpatient. Nutrition Related Findings:    Labs reviewed, Mg 1.5. Meds reviewed, diuretic use noted. Skin intact. Last  Wound Type: None       Current Nutrition Intake & Therapies:    Average Meal Intake: Unable to assess  Average Supplements Intake: None Ordered  ADULT DIET; Regular; Low Sodium (2 gm)    Anthropometric Measures:  Height: 5' 8\" (172.7 cm)  Ideal Body Weight (IBW): 154 lbs (70 kg)    Admission Body Weight: 347 lb 7.1 oz (157.6 kg)  Current Body Weight: 347 lb 7.1 oz (157.6 kg), 225.6 % IBW.  Weight Source: Bed Scale  Current BMI (kg/m2): 52.8        Weight Adjustment For: No
GASTROENTEROLOGY INPATIENT CONSULTATION        IDENTIFYING DATA/REASON FOR CONSULTATION   PATIENT:  Viktoriya Post  MRN:  [id number]  ADMIT DATE: 6/18/2023  TIME OF EVALUATION: 6/21/2023 9:49 AM  HOSPITAL STAY:   LOS: 3 days     REASON FOR CONSULTATION:  Cirrhosis    HISTORY OF PRESENT ILLNESS   Viktoriya Post is a 60 y.o. male with a PMH of SIMON cirrhosis, COPD, Gout, HTN, asthma, arthritis, who presented on 6/18/2023 with worsening shortness of breath. CXR and CT were consistent with pulmonary edema. He has been seen by Dr. Mercedes Rodriguez for his cirrhosis in the past. No follow-up since 2019. LFTs and PT/INR are normal. He has no GI complaints at this time. Prior Endoscopic Evaluations:  EGD in 2019 with Dr. Mercedes Rodriguez:  1) mild diffuse portal hypertensive gastropathy  2) no evidence of gastric or esophageal varices  3) 2 mm sessile polyp in the fundus of stomach removed with the biopsy forceps. 4) Mild to moderate antral gastritis. EGD and colonoscopy in 2018 with Dr. Mercedes Rodriguez:  EGD DIAGNOSIS:   1) mild gastritis   2) no esophageal or gastric varices. COLONOSCOPY DIAGNOSIS:   1)  Normal colonic mucosa throughout including the Terminal ileum   up to 10 cm.     2) distal rectal polyp excised with the hot snare. Possible anal   tag.      PAST MEDICAL, SURGICAL, FAMILY, and SOCIAL HISTORY     Past Medical History:   Diagnosis Date    Arthritis     Asthma     COPD (chronic obstructive pulmonary disease) ([address]     Gout     High blood pressure      Past Surgical History:   Procedure Laterality Date    EYE SURGERY      KNEE ARTHROSCOPY Right 6/26/2014    medial meniscectomy & chondroplasty     Family History   Problem Relation Age of Onset    Anemia Sister     Other Father         gout     Social History     Socioeconomic History    Marital status:    Occupational History    Occupation:      Employer: [employer]   Tobacco Use    Smoking status: Never    Smokeless tobacco: Never   Substance
filed at 6/20/2023 1231  Gross per 24 hour   Intake 960 ml   Output --   Net 960 ml       LABS  BMP:   Lab Results   Component Value Date/Time     06/20/2023 06:48 AM    K 3.8 06/20/2023 06:48 AM    K 4.9 06/18/2023 02:06 PM    CL 92 06/20/2023 06:48 AM    CO2 35 06/20/2023 06:48 AM    BUN 23 06/20/2023 06:48 AM    LABALBU 3.7 06/18/2023 02:06 PM    CREATININE 1.4 06/20/2023 06:48 AM    CALCIUM 9.8 06/20/2023 06:48 AM    GFRAA >60 03/14/2015 03:55 AM    LABGLOM 56 06/20/2023 06:48 AM    GLUCOSE 131 06/20/2023 06:48 AM     CBC:   Recent Labs     06/18/23  1436 06/19/23  0507   WBC 6.8 6.8   HGB 12.0* 12.8*   HCT 36.3* 38.5*   .7* 102.3*   * 144     BNP: No results found for: BNP    ECHOCARDIOGRAM:         Summary   Ejection fraction is visually estimated to be 55-60 %. Borderline concentric left ventricular hypertrophy. Grade II diastolic dysfunction with normal LV filling pressures. Mild mitral regurgitation is present. The left atrium is mild to moderate dilated. The right ventricle is mildly dilated with normal systolic function   Moderate tricuspid regurgitation. Estimated pulmonary artery systolic pressure is mildly to moderately   elevated at _ 45 _ mmHg assuming a right atrial pressure of 8 mmHg. Signature      ------------------------------------------------------------------   Electronically signed by Mirna Marquez MD (Interpreting   physician) on 06/19/2023 at 08:58 AM   ------------------------------------------------------------------  Assessment:     CONSULTS:   [address] CONSULT TO DIETITIAN    Patient has a CARDIOLOGY CONSULT: No      Patient taking an ACEI/ARB:  Yes       Patient taking a BETA BLOCKER:  Yes    SCALE AVAILABLE:  No: (he will get one)     EDUCATION STATUS: Patient   [x]  Provided both written and verbal education on Heart Failure signs/symptoms. [x]  Provided instructions on daily medications.    [x]

## 2023-06-21 NOTE — PROGRESS NOTES
Discharge instructions and medications reviewed with patient. Pt. Verbalized understanding. Denies questions. Discontinued IV without complications. Pt. tolerated well.
Hospitalist Progress Note  6/20/2023 7:54 AM    PCP: Lizette Cogan    [phone]     Date of Admission: 6/18/2023                                                                                                                     HOSPITAL COURSE    Patient demographics:  The patient  Darryle Lefevre is a 60 y.o. male     Significant past medical history:   Patient Active Problem List   Diagnosis    Medial meniscus tear    Meniscal cyst    Osteoarthritis of right knee    Tear of medial meniscus of right knee    S/P arthroscopy of right knee    SOB (shortness of breath)         Presenting symptoms:  sob    Diagnostic workup:      CONSULTS DURING ADMISSION :   IP CONSULT TO HEART FAILURE NURSE/COORDINATOR  IP CONSULT TO DIETITIAN      Patient was diagnosed with:        Treatment while inpatient:  The patient Darryle Lefevre is a 60 y.o.male   Patient presented to the emergency room with worsening shortness of breath.                                                                                       ----------------------------------------------------------      SUBJECTIVE COMPLAINTS- sob    Diet: ADULT DIET; Regular; Low Sodium (2 gm); 1500 ml      OBJECTIVE:   Patient Active Problem List   Diagnosis    Medial meniscus tear    Meniscal cyst    Osteoarthritis of right knee    Tear of medial meniscus of right knee    S/P arthroscopy of right knee    SOB (shortness of breath)       Allergies  Patient has no known allergies.     Medications    Scheduled Meds:   azithromycin  500 mg IntraVENous Once    aspirin  81 mg Oral Daily    lisinopril  40 mg Oral Daily    insulin lispro  0-8 Units SubCUTAneous TID WC    insulin lispro  0-4 Units SubCUTAneous Nightly    vitamin D  5,000 Units Oral Daily    sodium chloride flush  5-40 mL IntraVENous 2 times per day    famotidine  20 mg Oral BID    enoxaparin  40 mg SubCUTAneous BID    metoprolol succinate  25 mg Oral Daily    furosemide  40 mg IntraVENous BID    levoFLOXacin  500 mg Oral
Hospitalist Progress Note  6/21/2023 9:20 AM    PCP: Cierra Reyna    [id number]     Date of Admission: 6/18/2023                                                                                                                     HOSPITAL COURSE    Patient demographics:  The patient  Lindsay Manuel is a 60 y.o. male     Significant past medical history:   Patient Active Problem List   Diagnosis    Medial meniscus tear    Meniscal cyst    Osteoarthritis of right knee    Tear of medial meniscus of right knee    S/P arthroscopy of right knee    SOB (shortness of breath)         Presenting symptoms:  sob    Diagnostic workup:      CONSULTS DURING ADMISSION :   IP CONSULT TO HEART FAILURE NURSE/COORDINATOR  IP CONSULT TO DIETITIAN  IP CONSULT TO GI      Patient was diagnosed with:        Treatment while inpatient:  The patient Lindsay Manuel is a 60 y.o.male   Patient presented to the emergency room with worsening shortness of breath. Patient was diagnosed with acute on chronic diastolic CHF  Patient was also started empirically on antibiotics. Patient responded to diuretics well. Pulmonary edema on patient's chest x-ray improved on repeat chest x-ray. Patient was noted to go into hypoxia while sleeping. Patient is suspected for obstructive sleep apnea for which patient should follow-up with pulmonary as an outpatient. Patient was otherwise breathing comfortably on room air. Discharge Condition:  stable     Discharged to:  Home     Activity:   as tolerated: Follow Up: Follow-up with PCP in 1-2 weeks                      ----------------------------------------------------------      SUBJECTIVE COMPLAINTS- sob    Diet: ADULT DIET; Regular;  Low Sodium (2 gm); 1500 ml      OBJECTIVE:   Patient Active Problem List   Diagnosis    Medial meniscus tear    Meniscal cyst    Osteoarthritis of right knee    Tear of medial meniscus of right knee    S/P
Message sent to Dr. Deon Dunlap about pt diet including fluid restrictions, awaiting response.  Electronically signed by Stephany Barber RN on 6/19/2023 at 12:12 PM
Patient de sated overnight, he was placed back on 2 L of O2 via nasal cannula
Patient in bed resting in bed at this time. Patient is  alert and oriented x 4. Able to make all needs known. Assessment completed. VS WNL. . IV capped and flushed. Fall precautions in place. Call light within reach. Pt given meds and tolerated well. Pt stated that he was upset that he had to check blood glucose because he doesn't do this at home. He stated that he does have hi. albuterol with him from home but understands  that we will supply meds.
Pharmacy Medication Reconciliation Note     List of medications patient is currently taking is complete. Source of information:   1. Rx dispense history  2. Patient interview    Notes regarding home medications:   1. Verified with patient no longer on Rosuvastatin  2. Patient states only on Metformin ER 500mg daily. Will check bottle when he goes home. Rx history says 1000mg daily.      Denies taking any other OTC or herbal medications    Kan Miguel [facility], [address] 6/19/2023 11:46 AM
Physician Progress Note      Bhanu Perez  CSN #:                  [id number]  :                       1960  ADMIT DATE:       2023 12:51 PM  [address] [place] DATE:  RESPONDING  PROVIDER #:        Mdadi Proctor MD          QUERY TEXT:    Dear Dr. Kings Cordova,  Patient admitted with BMI 52.83. If possible, please document in progress   notes and discharge summary if you are evaluating and /or treating any of the   following: The medical record reflects the following:  Risk Factors: Hx DM, HTN  Clinical Indicators: BMI=52.83, 5' 8\", 347 lb  Treatment:  when appropriate  Thank you,  Aidan Shaver RN, CDS  [email or web address]    ? Specificity of obesity and morbid obesity should be reported based on   physician documentation, as there are several published classifications and   definitions?  MS-DRG Training Guide. CDC:   https://cook-avila.info/. WHO:   http://frias.biz/. NIH:   LargeFood.be  Options provided:  -- Morbid obesity  -- Obesity  -- Severe obesity  -- Other - I will add my own diagnosis  -- Disagree - Not applicable / Not valid  -- Disagree - Clinically unable to determine / Unknown  -- Refer to Clinical Documentation Reviewer    PROVIDER RESPONSE TEXT:    This patient has morbid obesity. Query created by:  David Guajardo on 2023 11:13 PM      Electronically signed by:  Maddi Proctor MD 2023 2:57 PM
Pt ambulated independently with all personal belongings. Wife transporting pt home. No distress or discomfort noted upon discharge.
Pt awake in bed. Pt denies SOB and chest pain at this time. Pt states he has no needs at this time. Call light placed within reach.  Electronically signed by Thalia Keys RN on 6/19/2023 at 7:50 PM
Pt awake sitting up chair. Pt denies pain at this time. Morning medications administered, pt tolerated well. No new orders at this time. Call light placed within reach.  Electronically signed by Becca Villeda RN on 6/20/2023 at 7:34 PM
Pt awake up in chair. Pt denies any pain at this time. Morning medications administered, pt tolerated well. No new orders. Call light placed within reach.  Electronically signed by Priti Cason RN on 6/19/2023 at 7:48 PM
Pt is alert and oriented x4, resting quietly in bed. Nightly medications and intake tolerated well. No complaints of nausea, vomiting, or pain a this time. Fall precautions in place. Call light within reach. No other needs made known at this time. Will continue to monitor.     Electronically signed by Cathy Luna RN on 6/20/2023 at 3:06 AM
Pt returned back unit via stretcher from echo.  Electronically signed by Nita Bob RN on 6/19/2023 at 9:01 AM
Pt sitting up in chair eyes closed. No signs of respiratory distress. No new orders at this time. Call light placed within reach.  Electronically signed by Thalia Keys RN on 6/20/2023 at 7:35 PM
Pt transported for echo via stretcher.  Electronically signed by Geovanna Cowan RN on 6/19/2023 at 9:00 AM
cetirizine  10 mg Oral Daily    ipratropium 0.5 mg-albuterol 2.5 mg  1 Dose Inhalation Q4H WA     Continuous Infusions:   sodium chloride      dextrose       PRN Meds:  sodium chloride flush, sodium chloride, ondansetron **OR** ondansetron, polyethylene glycol, acetaminophen **OR** acetaminophen, potassium chloride **OR** potassium alternative oral replacement **OR** potassium chloride, magnesium sulfate, glucose, dextrose bolus **OR** dextrose bolus, glucagon (rDNA), dextrose    Vitals   Vitals /wt Patient Vitals for the past 8 hrs:   BP Temp Pulse Resp SpO2   06/19/23 0719 -- -- -- -- 90 %   06/19/23 0710 124/74 98.3 °F (36.8 °C) 60 16 90 %        72HR INTAKE/OUTPUT:  No intake or output data in the 24 hours ending 06/19/23 0823    Exam:    Gen:   Alert and oriented ×3    Eyes: PERRL. No sclera icterus. No conjunctival injection. ENT: No discharge. Pharynx clear. External appearance of ears and nose normal.  Neck: Trachea midline. No obvious mass. Resp: No accessory muscle use. No crackles. No wheezes. No rhonchi. CV: Regular rate. Regular rhythm. No murmur or rub. No edema. GI: Non-tender. Non-distended. No hernia. Skin: Warm, dry, normal texture and turgor. Lymph: No cervical LAD. No supraclavicular LAD. M/S: / Ext. No cyanosis. No clubbing. No joint deformity. Neuro: CN 2-12 are intact,  no neurologic deficits noted. PT/INR:   Recent Labs     06/18/23  1406 06/19/23  0507   PROTIME 13.3 14.7   INR 1.01 1.15     APTT: No results for input(s): APTT in the last 72 hours. CBC:   Recent Labs     06/18/23  1436 06/19/23  0507   WBC 6.8 6.8   HGB 12.0* 12.8*   HCT 36.3* 38.5*   .7* 102.3*   * 144       BMP:   Recent Labs     06/18/23  1406 06/19/23  0507    138   K 4.9 4.0    95*   CO2 31 31   BUN 18 19   CREATININE 1.0 1.3       LIVER PROFILE:   Recent Labs     06/18/23  1406   ALKPHOS 64   AST 36   ALT 13   BILITOT 0.7     No results for input(s):  AMYLASE in the last

## 2023-06-21 NOTE — PLAN OF CARE
Problem: Discharge Planning  Goal: Discharge to home or other facility with appropriate resources  6/20/2023 0306 by Hari Bush RN  Outcome: Progressing  Flowsheets (Taken 6/20/2023 0306)  Discharge to home or other facility with appropriate resources:   Identify barriers to discharge with patient and caregiver   Arrange for needed discharge resources and transportation as appropriate     Problem: Safety - Adult  Goal: Free from fall injury  6/20/2023 0306 by Hari Bush RN  Outcome: Progressing  Flowsheets (Taken 6/20/2023 0306)  Free From Fall Injury: Instruct family/caregiver on patient safety     Problem: Pain  Goal: Verbalizes/displays adequate comfort level or baseline comfort level  6/20/2023 0306 by Hari Bush RN  Outcome: Progressing  Flowsheets (Taken 6/20/2023 0306)  Verbalizes/displays adequate comfort level or baseline comfort level:   Encourage patient to monitor pain and request assistance   Assess pain using appropriate pain scale   Implement non-pharmacological measures as appropriate and evaluate response   Administer analgesics based on type and severity of pain and evaluate response     Problem: Nutrition Deficit:  Goal: Optimize nutritional status  6/20/2023 0306 by Hari Bush RN  Outcome: Progressing  Flowsheets (Taken 6/20/2023 0306)  Nutrient intake appropriate for improving, restoring, or maintaining nutritional needs:   Assess nutritional status and recommend course of action   Monitor oral intake, labs, and treatment plans     Problem: Respiratory - Adult  Goal: Achieves optimal ventilation and oxygenation  6/20/2023 0306 by Hari Bush RN  Outcome: Progressing  Flowsheets (Taken 6/20/2023 0306)  Achieves optimal ventilation and oxygenation:   Assess for changes in respiratory status   Assess for changes in mentation and behavior   Position to facilitate oxygenation and minimize respiratory effort     Problem: Cardiovascular - Adult  Goal: Maintains optimal cardiac
Problem: Discharge Planning  Goal: Discharge to home or other facility with appropriate resources  6/21/2023 1518 by Earnestine Chandler RN  Outcome: Completed  6/21/2023 0848 by Earnestine Chandler RN  Outcome: Progressing  Flowsheets (Taken 6/21/2023 0800)  Discharge to home or other facility with appropriate resources:   Identify barriers to discharge with patient and caregiver   Arrange for needed discharge resources and transportation as appropriate   Identify discharge learning needs (meds, wound care, etc)     Problem: Safety - Adult  Goal: Free from fall injury  6/21/2023 1518 by Earnestine Chandler RN  Outcome: Completed  Flowsheets (Taken 6/21/2023 0849)  Free From Fall Injury:   Instruct family/caregiver on patient safety   Based on caregiver fall risk screen, instruct family/caregiver to ask for assistance with transferring infant if caregiver noted to have fall risk factors  6/21/2023 0848 by Earnestine Chandler RN  Outcome: Progressing  6/21/2023 0351 by Theresa Davenport RN  Outcome: Progressing     Problem: Pain  Goal: Verbalizes/displays adequate comfort level or baseline comfort level  6/21/2023 1518 by Earnestine Chandler RN  Outcome: Completed  6/21/2023 0848 by Earnestine Chandler RN  Outcome: Progressing     Problem: Nutrition Deficit:  Goal: Optimize nutritional status  6/21/2023 1518 by Earnestine Chandler RN  Outcome: Completed  6/21/2023 0848 by Earnestine Chandler RN  Outcome: Progressing     Problem: Respiratory - Adult  Goal: Achieves optimal ventilation and oxygenation  6/21/2023 1518 by Earnestine Chandler RN  Outcome: Completed  6/21/2023 0848 by Earnestine Chandler RN  Outcome: Progressing     Problem: Cardiovascular - Adult  Goal: Maintains optimal cardiac output and hemodynamic stability  6/21/2023 1518 by Earnestine Chandler RN  Outcome: Completed  6/21/2023 0848 by Earnestine Chandler RN  Outcome: Progressing  Goal: Absence of cardiac dysrhythmias or at baseline  6/21/2023 1518 by Earnestine Chandler RN  Outcome: Completed  6/21/2023 0848 by
Problem: Discharge Planning  Goal: Discharge to home or other facility with appropriate resources  Outcome: Progressing  Flowsheets (Taken 6/18/2023 2231)  Discharge to home or other facility with appropriate resources: Identify barriers to discharge with patient and caregiver     Problem: Safety - Adult  Goal: Free from fall injury  Outcome: Progressing     Problem: Pain  Goal: Verbalizes/displays adequate comfort level or baseline comfort level  Outcome: Progressing
Problem: Safety - Adult  Goal: Free from fall injury  6/21/2023 0351 by Lonzell Collet, RN  Outcome: Progressing  6/20/2023 1932 by Nita Bob, RN  Outcome: Progressing  Flowsheets (Taken 6/20/2023 1932)  Free From Fall Injury: Instruct family/caregiver on patient safety
LEORA Urias  Outcome: Progressing  Flowsheets (Taken 6/20/2023 1932)  Achieves optimal ventilation and oxygenation: Assess for changes in respiratory status     Problem: Cardiovascular - Adult  Goal: Maintains optimal cardiac output and hemodynamic stability  6/21/2023 0848 by Laurel Buchanan RN  Outcome: Progressing  6/20/2023 1932 by Becca Villeda RN  Outcome: Progressing  Flowsheets (Taken 6/20/2023 1932)  Maintains optimal cardiac output and hemodynamic stability: Monitor blood pressure and heart rate  Goal: Absence of cardiac dysrhythmias or at baseline  6/21/2023 0848 by Laurel Buchanan RN  Outcome: Progressing  6/20/2023 1932 by Becca Villeda RN  Outcome: Progressing  Flowsheets (Taken 6/20/2023 1932)  Absence of cardiac dysrhythmias or at baseline: Monitor cardiac rate and rhythm     Problem: Metabolic/Fluid and Electrolytes - Adult  Goal: Electrolytes maintained within normal limits  6/21/2023 0848 by Lauerl Buchanan RN  Outcome: Progressing  6/20/2023 1932 by Becca Villeda RN  Outcome: Progressing  Flowsheets (Taken 6/20/2023 1932)  Electrolytes maintained within normal limits: Monitor labs and assess patient for signs and symptoms of electrolyte imbalances  Goal: Hemodynamic stability and optimal renal function maintained  6/21/2023 0848 by Laurel Buchanan RN  Outcome: Progressing  6/20/2023 1932 by Becca Villeda RN  Outcome: Progressing  Flowsheets (Taken 6/20/2023 1932)  Hemodynamic stability and optimal renal function maintained:   Monitor labs and assess for signs and symptoms of volume excess or deficit   Monitor intake, output and patient weight
Monitor oral intake, labs, and treatment plans   Provide specific nutrition education to patient or family as appropriate     Problem: Respiratory - Adult  Goal: Achieves optimal ventilation and oxygenation  Outcome: Progressing  Flowsheets (Taken 6/19/2023 1434)  Achieves optimal ventilation and oxygenation: Assess for changes in respiratory status     Problem: Cardiovascular - Adult  Goal: Maintains optimal cardiac output and hemodynamic stability  Outcome: Progressing  Flowsheets (Taken 6/19/2023 1434)  Maintains optimal cardiac output and hemodynamic stability: Monitor blood pressure and heart rate  Goal: Absence of cardiac dysrhythmias or at baseline  Outcome: Progressing  Flowsheets (Taken 6/19/2023 1434)  Absence of cardiac dysrhythmias or at baseline: Monitor cardiac rate and rhythm     Problem: Metabolic/Fluid and Electrolytes - Adult  Goal: Electrolytes maintained within normal limits  Outcome: Progressing  Goal: Hemodynamic stability and optimal renal function maintained  Outcome: Progressing  Flowsheets (Taken 6/19/2023 1434)  Hemodynamic stability and optimal renal function maintained: Monitor labs and assess for signs and symptoms of volume excess or deficit
cardiac dysrhythmias or at baseline: Monitor cardiac rate and rhythm     Problem: Metabolic/Fluid and Electrolytes - Adult  Goal: Electrolytes maintained within normal limits  Outcome: Progressing  Flowsheets (Taken 6/20/2023 1932)  Electrolytes maintained within normal limits: Monitor labs and assess patient for signs and symptoms of electrolyte imbalances  Goal: Hemodynamic stability and optimal renal function maintained  Outcome: Progressing  Flowsheets (Taken 6/20/2023 1932)  Hemodynamic stability and optimal renal function maintained:   Monitor labs and assess for signs and symptoms of volume excess or deficit   Monitor intake, output and patient weight
evaluate response   Consider cultural and social influences on pain and pain management   Assess pain using appropriate pain scale   Implement non-pharmacological measures as appropriate and evaluate response   Notify Licensed Independent Practitioner if interventions unsuccessful or patient reports new pain  6/19/2023 1434 by Jagruti Green RN  Outcome: Progressing  Flowsheets (Taken 6/19/2023 1434)  Verbalizes/displays adequate comfort level or baseline comfort level:   Encourage patient to monitor pain and request assistance   Assess pain using appropriate pain scale   Administer analgesics based on type and severity of pain and evaluate response   Implement non-pharmacological measures as appropriate and evaluate response   Notify Licensed Independent Practitioner if interventions unsuccessful or patient reports new pain     Problem: Nutrition Deficit:  Goal: Optimize nutritional status  6/19/2023 1942 by Jagruti Green RN  Outcome: Progressing  Flowsheets (Taken 6/19/2023 1942)  Nutrient intake appropriate for improving, restoring, or maintaining nutritional needs: Assess nutritional status and recommend course of action  6/19/2023 1434 by Jagruti Green RN  Outcome: Progressing  Flowsheets  Taken 6/19/2023 1434 by Jagruti Green RN  Nutrient intake appropriate for improving, restoring, or maintaining nutritional needs: Assess nutritional status and recommend course of action  Taken 6/19/2023 1333 by Smooth Salinas RD  Nutrient intake appropriate for improving, restoring, or maintaining nutritional needs:   Assess nutritional status and recommend course of action   Recommend appropriate diets, oral nutritional supplements, and vitamin/mineral supplements   Monitor oral intake, labs, and treatment plans   Provide specific nutrition education to patient or family as appropriate     Problem: Respiratory - Adult  Goal: Achieves optimal ventilation and oxygenation  6/19/2023 1942 by Jagruti Green

## 2023-06-22 LAB
BACTERIA BLD CULT ORG #2: NORMAL
BACTERIA BLD CULT: NORMAL

## 2023-06-23 LAB
A1AT PHENOTYP SERPL-IMP: NORMAL
A1AT SERPL-MCNC: 147 MG/DL (ref 90–200)
AFP-TM SERPL-MCNC: 2.9 UG/L

## 2023-06-25 LAB
HFE GENE MUT ANL BLD/T: NORMAL
HFE P.C282Y BLD/T QL: NEGATIVE
HFE P.H63D BLD/T QL: NEGATIVE
HFE P.S65C BLD/T QL: NEGATIVE
SPECIMEN SOURCE: NORMAL

## 2023-06-26 ENCOUNTER — FOLLOWUP TELEPHONE ENCOUNTER (OUTPATIENT)
Dept: INPATIENT UNIT | Age: 63
End: 2023-06-26